# Patient Record
Sex: MALE | Race: WHITE | NOT HISPANIC OR LATINO | Employment: FULL TIME | URBAN - METROPOLITAN AREA
[De-identification: names, ages, dates, MRNs, and addresses within clinical notes are randomized per-mention and may not be internally consistent; named-entity substitution may affect disease eponyms.]

---

## 2023-02-24 PROBLEM — L64.9 ALOPECIA, MALE PATTERN: Status: ACTIVE | Noted: 2023-02-24

## 2023-02-24 PROBLEM — M79.12 MYALGIA OF AUXILIARY MUSCLES, HEAD AND NECK: Status: ACTIVE | Noted: 2023-02-24

## 2023-02-24 PROBLEM — Z98.52 VASECTOMY STATUS: Status: ACTIVE | Noted: 2023-02-24

## 2023-02-24 PROBLEM — M54.2 CHRONIC NECK PAIN: Status: ACTIVE | Noted: 2023-02-24

## 2023-02-24 PROBLEM — D22.9 SKIN MOLE: Status: ACTIVE | Noted: 2023-02-24

## 2023-02-24 PROBLEM — G89.29 CHRONIC NECK PAIN: Status: ACTIVE | Noted: 2023-02-24

## 2023-02-24 PROBLEM — M99.02 SEGMENTAL AND SOMATIC DYSFUNCTION OF THORACIC REGION: Status: ACTIVE | Noted: 2023-02-24

## 2023-02-24 PROBLEM — M79.9 POSTURAL STRAIN: Status: ACTIVE | Noted: 2023-02-24

## 2023-02-24 PROBLEM — M99.05 SEGMENTAL AND SOMATIC DYSFUNCTION OF PELVIC REGION: Status: ACTIVE | Noted: 2023-02-24

## 2023-02-24 PROBLEM — M99.01 SEGMENTAL AND SOMATIC DYSFUNCTION OF CERVICAL REGION: Status: ACTIVE | Noted: 2023-02-24

## 2023-02-24 PROBLEM — M99.00 SEGMENTAL AND SOMATIC DYSFUNCTION OF HEAD REGION: Status: ACTIVE | Noted: 2023-02-24

## 2023-02-24 PROBLEM — M54.50 LUMBAGO WITHOUT SCIATICA: Status: ACTIVE | Noted: 2023-02-24

## 2023-02-24 RX ORDER — FINASTERIDE 1 MG/1
1 TABLET, FILM COATED ORAL DAILY
COMMUNITY
Start: 2022-03-23

## 2023-02-24 RX ORDER — TIZANIDINE 4 MG/1
1 TABLET ORAL DAILY
COMMUNITY
End: 2023-10-09 | Stop reason: SDUPTHER

## 2023-03-13 DIAGNOSIS — Z00.00 HEALTH CARE MAINTENANCE: ICD-10-CM

## 2023-03-21 ENCOUNTER — OFFICE VISIT (OUTPATIENT)
Dept: PRIMARY CARE | Facility: CLINIC | Age: 38
End: 2023-03-21
Payer: COMMERCIAL

## 2023-03-21 VITALS
OXYGEN SATURATION: 100 % | RESPIRATION RATE: 18 BRPM | BODY MASS INDEX: 24.61 KG/M2 | WEIGHT: 162.4 LBS | HEART RATE: 71 BPM | HEIGHT: 68 IN

## 2023-03-21 DIAGNOSIS — Z00.00 ROUTINE GENERAL MEDICAL EXAMINATION AT A HEALTH CARE FACILITY: Primary | ICD-10-CM

## 2023-03-21 PROCEDURE — 99395 PREV VISIT EST AGE 18-39: CPT | Performed by: INTERNAL MEDICINE

## 2023-03-21 ASSESSMENT — PROMIS GLOBAL HEALTH SCALE
CARRYOUT_PHYSICAL_ACTIVITIES: COMPLETELY
EMOTIONAL_PROBLEMS: SOMETIMES
RATE_AVERAGE_PAIN: 5
RATE_GENERAL_HEALTH: VERY GOOD
RATE_PHYSICAL_HEALTH: GOOD
RATE_MENTAL_HEALTH: GOOD
RATE_AVERAGE_FATIGUE: MILD
CARRYOUT_SOCIAL_ACTIVITIES: VERY GOOD
RATE_SOCIAL_SATISFACTION: GOOD
RATE_QUALITY_OF_LIFE: VERY GOOD

## 2023-03-21 ASSESSMENT — ENCOUNTER SYMPTOMS
EYES NEGATIVE: 1
ALLERGIC/IMMUNOLOGIC NEGATIVE: 1
PSYCHIATRIC NEGATIVE: 1
NEUROLOGICAL NEGATIVE: 1
RESPIRATORY NEGATIVE: 1
GASTROINTESTINAL NEGATIVE: 1
DEPRESSION: 0
CARDIOVASCULAR NEGATIVE: 1
HEMATOLOGIC/LYMPHATIC NEGATIVE: 1
LOSS OF SENSATION IN FEET: 0
ENDOCRINE NEGATIVE: 1
CONSTITUTIONAL NEGATIVE: 1
OCCASIONAL FEELINGS OF UNSTEADINESS: 0

## 2023-03-21 NOTE — PROGRESS NOTES
"Subjective   Patient ID: Sharath Harris is a 37 y.o. male who presents for Annual Exam.    CPE :    37 M    Doing Well    Male pattern hair loss    H/o Cervical/Trapezius muscle strain        HPI     Review of Systems   Constitutional: Negative.    HENT: Negative.     Eyes: Negative.    Respiratory: Negative.     Cardiovascular: Negative.    Gastrointestinal: Negative.    Endocrine: Negative.    Genitourinary: Negative.    Skin: Negative.    Allergic/Immunologic: Negative.    Neurological: Negative.    Hematological: Negative.    Psychiatric/Behavioral: Negative.         Objective   Pulse 71   Resp 18   Ht 1.715 m (5' 7.5\")   Wt 73.7 kg (162 lb 6.4 oz)   SpO2 100%   BMI 25.06 kg/m²     Physical Exam  Constitutional:       Appearance: Normal appearance.   HENT:      Head: Normocephalic and atraumatic.      Right Ear: Tympanic membrane, ear canal and external ear normal.      Left Ear: Tympanic membrane, ear canal and external ear normal.      Nose: Nose normal.      Mouth/Throat:      Mouth: Mucous membranes are moist.   Eyes:      Extraocular Movements: Extraocular movements intact.      Conjunctiva/sclera: Conjunctivae normal.      Pupils: Pupils are equal, round, and reactive to light.   Cardiovascular:      Rate and Rhythm: Normal rate and regular rhythm.      Pulses: Normal pulses.      Heart sounds: Normal heart sounds.   Pulmonary:      Effort: Pulmonary effort is normal.      Breath sounds: Normal breath sounds.   Abdominal:      General: Abdomen is flat. Bowel sounds are normal.      Palpations: Abdomen is soft.   Genitourinary:     Testes: Normal.   Musculoskeletal:         General: Normal range of motion.      Cervical back: Normal range of motion.   Skin:     General: Skin is warm.   Neurological:      General: No focal deficit present.      Mental Status: He is alert and oriented to person, place, and time.   Psychiatric:         Mood and Affect: Mood normal.         Behavior: Behavior normal. "         Assessment/Plan        R finger wart    Male pattern hair loss    H/o Cervical/Trapezius muscle strain    Plan:    Labs    Continue with current treatment.    Follow up in 12 months/PRN

## 2023-03-22 ENCOUNTER — LAB (OUTPATIENT)
Dept: LAB | Facility: LAB | Age: 38
End: 2023-03-22
Payer: COMMERCIAL

## 2023-03-22 DIAGNOSIS — Z00.00 HEALTH CARE MAINTENANCE: ICD-10-CM

## 2023-03-22 LAB
ALANINE AMINOTRANSFERASE (SGPT) (U/L) IN SER/PLAS: 17 U/L (ref 10–52)
ALBUMIN (G/DL) IN SER/PLAS: 5.1 G/DL (ref 3.4–5)
ALKALINE PHOSPHATASE (U/L) IN SER/PLAS: 52 U/L (ref 33–120)
ANION GAP IN SER/PLAS: 16 MMOL/L (ref 10–20)
ASPARTATE AMINOTRANSFERASE (SGOT) (U/L) IN SER/PLAS: 22 U/L (ref 9–39)
BASOPHILS (10*3/UL) IN BLOOD BY AUTOMATED COUNT: 0.03 X10E9/L (ref 0–0.1)
BASOPHILS/100 LEUKOCYTES IN BLOOD BY AUTOMATED COUNT: 0.4 % (ref 0–2)
BILIRUBIN TOTAL (MG/DL) IN SER/PLAS: 0.8 MG/DL (ref 0–1.2)
CALCIUM (MG/DL) IN SER/PLAS: 10.5 MG/DL (ref 8.6–10.6)
CARBON DIOXIDE, TOTAL (MMOL/L) IN SER/PLAS: 27 MMOL/L (ref 21–32)
CHLORIDE (MMOL/L) IN SER/PLAS: 101 MMOL/L (ref 98–107)
CHOLESTEROL (MG/DL) IN SER/PLAS: 237 MG/DL (ref 0–199)
CHOLESTEROL IN HDL (MG/DL) IN SER/PLAS: 103.3 MG/DL
CHOLESTEROL/HDL RATIO: 2.3
CREATININE (MG/DL) IN SER/PLAS: 1.01 MG/DL (ref 0.5–1.3)
EOSINOPHILS (10*3/UL) IN BLOOD BY AUTOMATED COUNT: 0.22 X10E9/L (ref 0–0.7)
EOSINOPHILS/100 LEUKOCYTES IN BLOOD BY AUTOMATED COUNT: 3.1 % (ref 0–6)
ERYTHROCYTE DISTRIBUTION WIDTH (RATIO) BY AUTOMATED COUNT: 12.1 % (ref 11.5–14.5)
ERYTHROCYTE MEAN CORPUSCULAR HEMOGLOBIN CONCENTRATION (G/DL) BY AUTOMATED: 34 G/DL (ref 32–36)
ERYTHROCYTE MEAN CORPUSCULAR VOLUME (FL) BY AUTOMATED COUNT: 88 FL (ref 80–100)
ERYTHROCYTES (10*6/UL) IN BLOOD BY AUTOMATED COUNT: 4.99 X10E12/L (ref 4.5–5.9)
GFR MALE: >90 ML/MIN/1.73M2
GLUCOSE (MG/DL) IN SER/PLAS: 102 MG/DL (ref 74–99)
HEMATOCRIT (%) IN BLOOD BY AUTOMATED COUNT: 44.1 % (ref 41–52)
HEMOGLOBIN (G/DL) IN BLOOD: 15 G/DL (ref 13.5–17.5)
IMMATURE GRANULOCYTES/100 LEUKOCYTES IN BLOOD BY AUTOMATED COUNT: 0.1 % (ref 0–0.9)
LDL: 112 MG/DL (ref 0–99)
LEUKOCYTES (10*3/UL) IN BLOOD BY AUTOMATED COUNT: 7 X10E9/L (ref 4.4–11.3)
LYMPHOCYTES (10*3/UL) IN BLOOD BY AUTOMATED COUNT: 3.04 X10E9/L (ref 1.2–4.8)
LYMPHOCYTES/100 LEUKOCYTES IN BLOOD BY AUTOMATED COUNT: 43.2 % (ref 13–44)
MONOCYTES (10*3/UL) IN BLOOD BY AUTOMATED COUNT: 0.78 X10E9/L (ref 0.1–1)
MONOCYTES/100 LEUKOCYTES IN BLOOD BY AUTOMATED COUNT: 11.1 % (ref 2–10)
MUCUS, URINE: NORMAL /LPF
NEUTROPHILS (10*3/UL) IN BLOOD BY AUTOMATED COUNT: 2.95 X10E9/L (ref 1.2–7.7)
NEUTROPHILS/100 LEUKOCYTES IN BLOOD BY AUTOMATED COUNT: 42.1 % (ref 40–80)
NRBC (PER 100 WBCS) BY AUTOMATED COUNT: 0 /100 WBC (ref 0–0)
PLATELETS (10*3/UL) IN BLOOD AUTOMATED COUNT: 284 X10E9/L (ref 150–450)
POTASSIUM (MMOL/L) IN SER/PLAS: 4.2 MMOL/L (ref 3.5–5.3)
PROTEIN TOTAL: 7.7 G/DL (ref 6.4–8.2)
RBC, URINE: 2 /HPF (ref 0–5)
SODIUM (MMOL/L) IN SER/PLAS: 140 MMOL/L (ref 136–145)
SQUAMOUS EPITHELIAL CELLS, URINE: <1 /HPF
THYROTROPIN (MIU/L) IN SER/PLAS BY DETECTION LIMIT <= 0.05 MIU/L: 1.37 MIU/L (ref 0.44–3.98)
TRIGLYCERIDE (MG/DL) IN SER/PLAS: 108 MG/DL (ref 0–149)
UREA NITROGEN (MG/DL) IN SER/PLAS: 17 MG/DL (ref 6–23)
VLDL: 22 MG/DL (ref 0–40)
WBC, URINE: 1 /HPF (ref 0–5)

## 2023-03-22 PROCEDURE — 84443 ASSAY THYROID STIM HORMONE: CPT

## 2023-03-22 PROCEDURE — 36415 COLL VENOUS BLD VENIPUNCTURE: CPT

## 2023-03-22 PROCEDURE — 80053 COMPREHEN METABOLIC PANEL: CPT

## 2023-03-22 PROCEDURE — 85025 COMPLETE CBC W/AUTO DIFF WBC: CPT

## 2023-03-22 PROCEDURE — 81001 URINALYSIS AUTO W/SCOPE: CPT

## 2023-03-22 PROCEDURE — 80061 LIPID PANEL: CPT

## 2023-10-09 DIAGNOSIS — M54.50 LOW BACK PAIN WITHOUT SCIATICA, UNSPECIFIED BACK PAIN LATERALITY, UNSPECIFIED CHRONICITY: ICD-10-CM

## 2023-10-09 RX ORDER — TIZANIDINE 4 MG/1
4 TABLET ORAL DAILY
Qty: 90 TABLET | Refills: 3 | Status: SHIPPED | OUTPATIENT
Start: 2023-10-09 | End: 2024-10-08

## 2024-07-15 DIAGNOSIS — M54.50 LOW BACK PAIN WITHOUT SCIATICA, UNSPECIFIED BACK PAIN LATERALITY, UNSPECIFIED CHRONICITY: ICD-10-CM

## 2024-07-15 RX ORDER — TIZANIDINE 4 MG/1
4 TABLET ORAL DAILY
Qty: 90 TABLET | Refills: 3 | Status: SHIPPED | OUTPATIENT
Start: 2024-07-15 | End: 2025-07-15

## 2024-08-09 ENCOUNTER — TELEMEDICINE (OUTPATIENT)
Dept: PRIMARY CARE | Facility: CLINIC | Age: 39
End: 2024-08-09
Payer: COMMERCIAL

## 2024-08-09 DIAGNOSIS — R05.8 UPPER AIRWAY COUGH SYNDROME: Primary | ICD-10-CM

## 2024-08-09 PROCEDURE — 99213 OFFICE O/P EST LOW 20 MIN: CPT | Performed by: INTERNAL MEDICINE

## 2024-08-09 RX ORDER — FLUTICASONE PROPIONATE 50 MCG
1 SPRAY, SUSPENSION (ML) NASAL 2 TIMES DAILY
Qty: 16 G | Refills: 1 | Status: SHIPPED | OUTPATIENT
Start: 2024-08-09 | End: 2024-10-08

## 2024-08-09 RX ORDER — AMOXICILLIN AND CLAVULANATE POTASSIUM 875; 125 MG/1; MG/1
875 TABLET, FILM COATED ORAL 2 TIMES DAILY
Qty: 14 TABLET | Refills: 0 | Status: SHIPPED | OUTPATIENT
Start: 2024-08-09 | End: 2024-08-16

## 2024-08-09 ASSESSMENT — ENCOUNTER SYMPTOMS
COUGH: 1
SWEATS: 0
FEVER: 0
WHEEZING: 1
MYALGIAS: 1
HEMOPTYSIS: 0
HEARTBURN: 0
SHORTNESS OF BREATH: 0
RHINORRHEA: 0
WEIGHT LOSS: 0
CHILLS: 0
SORE THROAT: 0
HEADACHES: 0

## 2024-08-09 NOTE — PROGRESS NOTES
Subjective   Patient ID: Sharath Harris is a 39 y.o. male who presents for Cough x 4 weeks      Cough  The current episode started more than 1 month ago. The problem has been gradually worsening. The problem occurs every few minutes. The cough is Non-productive. Associated symptoms include ear congestion, myalgias, postnasal drip and wheezing. Pertinent negatives include no chest pain, chills, ear pain, fever, headaches, heartburn, hemoptysis, nasal congestion, rash, rhinorrhea, sore throat, shortness of breath, sweats or weight loss. Nothing aggravates the symptoms.        Ear congestion    Cough- no phlegm            Review of Systems   Constitutional:  Negative for chills, fever and weight loss.   HENT:  Positive for postnasal drip. Negative for ear pain, rhinorrhea and sore throat.    Respiratory:  Positive for cough and wheezing. Negative for hemoptysis and shortness of breath.    Cardiovascular:  Negative for chest pain.   Gastrointestinal:  Negative for heartburn.   Musculoskeletal:  Positive for myalgias.   Skin:  Negative for rash.   Neurological:  Negative for headaches.       + PND  No Fever/chills/headaches/dizziness/chest pains/ shortness of breath/palpitations/ abdominal pain /Nausea/vomiting/diarrhea/ constipation/urine frequency/blood in urine.    Cough    Objective   There were no vitals taken for this visit.    Physical Exam    N/A    Assessment/Plan        Upper airway cough syndrome       Infection Vs Inflammation Vs Allergic    Plan:    Augmentin 875 mg bid x10 days     Fluticasone- 1 spray per nostril BID

## 2024-08-23 ENCOUNTER — OFFICE VISIT (OUTPATIENT)
Dept: PRIMARY CARE | Facility: CLINIC | Age: 39
End: 2024-08-23
Payer: COMMERCIAL

## 2024-08-23 ENCOUNTER — LAB (OUTPATIENT)
Dept: LAB | Facility: LAB | Age: 39
End: 2024-08-23
Payer: COMMERCIAL

## 2024-08-23 VITALS
HEIGHT: 68 IN | HEART RATE: 70 BPM | TEMPERATURE: 98.1 F | WEIGHT: 159.2 LBS | BODY MASS INDEX: 24.13 KG/M2 | DIASTOLIC BLOOD PRESSURE: 70 MMHG | SYSTOLIC BLOOD PRESSURE: 123 MMHG

## 2024-08-23 DIAGNOSIS — Z11.59 ENCOUNTER FOR HEPATITIS C SCREENING TEST FOR LOW RISK PATIENT: ICD-10-CM

## 2024-08-23 DIAGNOSIS — Z00.00 ROUTINE GENERAL MEDICAL EXAMINATION AT A HEALTH CARE FACILITY: ICD-10-CM

## 2024-08-23 DIAGNOSIS — Z11.4 SCREENING FOR HIV (HUMAN IMMUNODEFICIENCY VIRUS): ICD-10-CM

## 2024-08-23 DIAGNOSIS — Z51.81 ENCOUNTER FOR MEDICATION MONITORING: ICD-10-CM

## 2024-08-23 DIAGNOSIS — Z51.81 ENCOUNTER FOR MEDICATION MONITORING: Primary | ICD-10-CM

## 2024-08-23 DIAGNOSIS — R05.1 ACUTE COUGH: ICD-10-CM

## 2024-08-23 LAB
ALBUMIN SERPL BCP-MCNC: 4.5 G/DL (ref 3.4–5)
ALP SERPL-CCNC: 47 U/L (ref 33–120)
ALT SERPL W P-5'-P-CCNC: 15 U/L (ref 10–52)
ANION GAP SERPL CALC-SCNC: 14 MMOL/L (ref 10–20)
AST SERPL W P-5'-P-CCNC: 21 U/L (ref 9–39)
BASOPHILS # BLD AUTO: 0.03 X10*3/UL (ref 0–0.1)
BASOPHILS NFR BLD AUTO: 0.5 %
BILIRUB SERPL-MCNC: 0.6 MG/DL (ref 0–1.2)
BUN SERPL-MCNC: 17 MG/DL (ref 6–23)
CALCIUM SERPL-MCNC: 10.1 MG/DL (ref 8.6–10.3)
CHLORIDE SERPL-SCNC: 100 MMOL/L (ref 98–107)
CO2 SERPL-SCNC: 29 MMOL/L (ref 21–32)
CREAT SERPL-MCNC: 1 MG/DL (ref 0.5–1.3)
EGFRCR SERPLBLD CKD-EPI 2021: >90 ML/MIN/1.73M*2
EOSINOPHIL # BLD AUTO: 0.13 X10*3/UL (ref 0–0.7)
EOSINOPHIL NFR BLD AUTO: 2 %
ERYTHROCYTE [DISTWIDTH] IN BLOOD BY AUTOMATED COUNT: 12 % (ref 11.5–14.5)
GLUCOSE SERPL-MCNC: 81 MG/DL (ref 74–99)
HCT VFR BLD AUTO: 42.1 % (ref 41–52)
HGB BLD-MCNC: 14.2 G/DL (ref 13.5–17.5)
IMM GRANULOCYTES # BLD AUTO: 0.01 X10*3/UL (ref 0–0.7)
IMM GRANULOCYTES NFR BLD AUTO: 0.2 % (ref 0–0.9)
LYMPHOCYTES # BLD AUTO: 2.63 X10*3/UL (ref 1.2–4.8)
LYMPHOCYTES NFR BLD AUTO: 40.9 %
MCH RBC QN AUTO: 30.2 PG (ref 26–34)
MCHC RBC AUTO-ENTMCNC: 33.7 G/DL (ref 32–36)
MCV RBC AUTO: 90 FL (ref 80–100)
MONOCYTES # BLD AUTO: 0.74 X10*3/UL (ref 0.1–1)
MONOCYTES NFR BLD AUTO: 11.5 %
NEUTROPHILS # BLD AUTO: 2.89 X10*3/UL (ref 1.2–7.7)
NEUTROPHILS NFR BLD AUTO: 44.9 %
NRBC BLD-RTO: 0 /100 WBCS (ref 0–0)
PLATELET # BLD AUTO: 313 X10*3/UL (ref 150–450)
POTASSIUM SERPL-SCNC: 4 MMOL/L (ref 3.5–5.3)
PROT SERPL-MCNC: 6.8 G/DL (ref 6.4–8.2)
RBC # BLD AUTO: 4.7 X10*6/UL (ref 4.5–5.9)
SODIUM SERPL-SCNC: 139 MMOL/L (ref 136–145)
TSH SERPL-ACNC: 0.88 MIU/L (ref 0.44–3.98)
WBC # BLD AUTO: 6.4 X10*3/UL (ref 4.4–11.3)

## 2024-08-23 PROCEDURE — 87389 HIV-1 AG W/HIV-1&-2 AB AG IA: CPT

## 2024-08-23 PROCEDURE — 85025 COMPLETE CBC W/AUTO DIFF WBC: CPT

## 2024-08-23 PROCEDURE — 80053 COMPREHEN METABOLIC PANEL: CPT

## 2024-08-23 PROCEDURE — 36415 COLL VENOUS BLD VENIPUNCTURE: CPT

## 2024-08-23 PROCEDURE — 84443 ASSAY THYROID STIM HORMONE: CPT

## 2024-08-23 PROCEDURE — 86803 HEPATITIS C AB TEST: CPT

## 2024-08-23 RX ORDER — BENZONATATE 200 MG/1
200 CAPSULE ORAL 3 TIMES DAILY PRN
Qty: 42 CAPSULE | Refills: 0 | Status: SHIPPED | OUTPATIENT
Start: 2024-08-23 | End: 2024-09-22

## 2024-08-23 ASSESSMENT — ENCOUNTER SYMPTOMS
OCCASIONAL FEELINGS OF UNSTEADINESS: 0
UNEXPECTED WEIGHT CHANGE: 0
HEMATURIA: 0
FEVER: 0
DEPRESSION: 0
LOSS OF SENSATION IN FEET: 0
DIZZINESS: 0
RHINORRHEA: 0
LIGHT-HEADEDNESS: 0
SHORTNESS OF BREATH: 0
EYE PAIN: 0
ABDOMINAL PAIN: 0
CHILLS: 0

## 2024-08-23 NOTE — PROGRESS NOTES
"Subjective     Patient ID: Sharath Harris is a 39 y.o. male who presents for  acute cough and physical.   Review of Systems   Constitutional:  Negative for chills, fever and unexpected weight change.   HENT:  Negative for rhinorrhea.    Eyes:  Negative for pain.   Respiratory:  Negative for shortness of breath.    Cardiovascular:  Negative for chest pain.   Gastrointestinal:  Negative for abdominal pain.   Genitourinary:  Negative for hematuria.   Skin:  Negative for rash.   Neurological:  Negative for dizziness, syncope and light-headedness.   Psychiatric/Behavioral:  Negative for behavioral problems and suicidal ideas.        /70 (BP Location: Right arm, Patient Position: Sitting, BP Cuff Size: Adult)   Pulse 70   Temp 36.7 °C (98.1 °F)   Ht 1.727 m (5' 8\")   Wt 72.2 kg (159 lb 3.2 oz)   BMI 24.21 kg/m²      Objective   Physical Exam  Vitals reviewed.   Constitutional:       General: He is not in acute distress.  HENT:      Head: Normocephalic.      Right Ear: External ear normal.      Left Ear: External ear normal.      Nose: No rhinorrhea.      Mouth/Throat:      Mouth: Mucous membranes are moist.   Eyes:      Conjunctiva/sclera: Conjunctivae normal.   Cardiovascular:      Rate and Rhythm: Normal rate and regular rhythm.      Heart sounds: No murmur heard.     No friction rub. No gallop.   Pulmonary:      Effort: No respiratory distress.      Breath sounds: No wheezing, rhonchi or rales.   Abdominal:      General: Bowel sounds are normal. There is no distension.      Palpations: Abdomen is soft.      Tenderness: There is no abdominal tenderness.   Musculoskeletal:      Cervical back: Neck supple.      Right lower leg: No edema.      Left lower leg: No edema.   Skin:     General: Skin is warm and dry.      Capillary Refill: Capillary refill takes less than 2 seconds.   Neurological:      Mental Status: He is alert.      Gait: Gait normal.           Labs:   Lab Results   Component Value Date    WBC " "6.4 08/23/2024    HGB 14.2 08/23/2024    HCT 42.1 08/23/2024     08/23/2024    TSH 0.88 08/23/2024    PSA 0.61 03/23/2022     Lab Results   Component Value Date     08/23/2024    K 4.0 08/23/2024     08/23/2024    BUN 17 08/23/2024    CREATININE 1.00 08/23/2024    GLUCOSE 81 08/23/2024    CALCIUM 10.1 08/23/2024    PROT 6.8 08/23/2024    BILITOT 0.6 08/23/2024    ALKPHOS 47 08/23/2024    AST 21 08/23/2024    ALT 15 08/23/2024     Lab Results   Component Value Date    CHOL 237 (H) 03/22/2023    CHOL 211 (H) 03/23/2022      Lab Results   Component Value Date    TRIG 108 03/22/2023    TRIG 66 03/23/2022      Lab Results   Component Value Date    .3 03/22/2023    HDL 87.0 03/23/2022     No results found for: \"LDLCALC\"   Lab Results   Component Value Date    VLDL 22 03/22/2023    VLDL 13 03/23/2022    No components found for: \"CHOLHDLRATI0\"    Imaging/Testing: CT HEART CALCIUM SCORING WO IV CONTRAST  MRN: [id number]  Patient Name: MILTON BRANDT     STUDY:  CT CARDIAC SCORING;  4/12/2022 8:17 am     INDICATION:  executive  Z00.00: Health Maintenance.     COMPARISON:  None.     ACCESSION NUMBER(S):  [id number]     ORDERING CLINICIAN:  MORGAN TITUS     TECHNIQUE:  Using prospective ECG gating, limited CT scan of the coronary  arteries was performed without intravenous contrast. Coronary calcium  scoring  was performed according to the method of Agatston.     FINDINGS:  The score and distribution of calcium in the coronary arteries is as  follows:     LM: 0.  LAD: 0.  LCx: 0.  RCA: 0.     Total: 0.     The visualized segments of the lungs are normally expanded.     The visualized mid/lower ascending thoracic aorta measures 3.2 cm in  diameter.     The heart is normal in size. Trace pericardial effusion.     No gross evidence of mediastinal or hilar lymphadenopathy is  identified.     Retroaortic left renal vein partially imaged. Mild bilateral  gynecomastia.     IMPRESSION:  1. Coronary artery calcium " "score of 0 *.  2. Additional findings as above.     *Coronary artery calcium scoring may be helpful in predicting the  risk for future coronary heart disease events.  According to the  American College of Cardiology Foundation Clinical Expert Consensus  Task Force, such testing provides important prognostic information in  patients with more than one coronary heart disease risk factor. The  coronary artery calcium score correlates with the annual risk of a  non-fatal myocardial infarction or coronary heart disease death.     Coronary artery score            Annual Risk     0-99                             0.4%  100-399                        1.3%  >400                            2.4%     These three \"breakpoints\" correspond to lower, intermediate and high  risk states for future coronary events.  Such information should be  used, along with appropriate clinical judgment, to make decisions  regarding the intensity of risk factor management strategies to treat  blood lipids and to modify other non-lipid coronary risk factors.     Reference: Williamson P et al. Circulation.  2007; 115:402-426    Assessment/Plan   Problem List Items Addressed This Visit    None  Visit Diagnoses       Encounter for medication monitoring    -  Primary    Relevant Orders    CBC and Auto Differential (Completed)    Comprehensive Metabolic Panel (Completed)    Acute cough        Relevant Medications    benzonatate (Tessalon) 150 mg capsule    Other Relevant Orders    XR chest 2 views    Routine general medical examination at a health care facility        Relevant Orders    Thyroid Stimulating Hormone (Completed)    Screening for HIV (human immunodeficiency virus)        Relevant Orders    HIV 1/2 Antigen/Antibody Screen with Reflex to Confirmation (Completed)    Encounter for hepatitis C screening test for low risk patient        Relevant Orders    Hepatitis C antibody (Completed)            [unfilled]    Patient and I discussed " diet/nutrition, lifestyle modifications, safety, medication indications and side effects, and health goals.    orders and follow up as documented in EMR, repeat labs ordered prior to next appointment, reviewed compliance with lifestyle measures, reviewed diet, exercise and weight control, reviewed medications and side effects in detail           I have reviewed OARRS report, consistent with prescribed medication. I have considered risks of abuse, diversion, side effects and feel clinically benefit of medication outweighs risks at this time.

## 2024-08-24 LAB
HCV AB SER QL: NONREACTIVE
HIV 1+2 AB+HIV1 P24 AG SERPL QL IA: NONREACTIVE

## 2024-09-03 ENCOUNTER — HOSPITAL ENCOUNTER (OUTPATIENT)
Dept: RADIOLOGY | Facility: CLINIC | Age: 39
Discharge: HOME | End: 2024-09-03
Payer: COMMERCIAL

## 2024-09-03 DIAGNOSIS — R05.9 COUGH, UNSPECIFIED TYPE: ICD-10-CM

## 2024-09-03 PROCEDURE — 71046 X-RAY EXAM CHEST 2 VIEWS: CPT | Mod: FOREIGN READ | Performed by: RADIOLOGY

## 2024-09-03 PROCEDURE — 71046 X-RAY EXAM CHEST 2 VIEWS: CPT

## 2024-10-10 ENCOUNTER — TELEPHONE (OUTPATIENT)
Dept: PRIMARY CARE | Facility: CLINIC | Age: 39
End: 2024-10-10

## 2024-10-10 DIAGNOSIS — M54.50 LOW BACK PAIN WITHOUT SCIATICA, UNSPECIFIED BACK PAIN LATERALITY, UNSPECIFIED CHRONICITY: ICD-10-CM

## 2024-10-10 RX ORDER — TIZANIDINE 4 MG/1
4 TABLET ORAL DAILY
Qty: 60 TABLET | Refills: 0 | Status: SHIPPED | OUTPATIENT
Start: 2024-10-10 | End: 2024-10-11 | Stop reason: SDUPTHER

## 2024-10-11 RX ORDER — TIZANIDINE 4 MG/1
4 TABLET ORAL DAILY
Qty: 60 TABLET | Refills: 0 | Status: SHIPPED | OUTPATIENT
Start: 2024-10-11 | End: 2024-12-10

## 2024-10-11 NOTE — TELEPHONE ENCOUNTER
I sent in the refill of Zanaflex to patient's pharmacy [facility].    Rigo Peterson MD, [facility]  Physician  Department of Family Medicine of [facility] - Primary Care

## 2024-11-12 ENCOUNTER — APPOINTMENT (OUTPATIENT)
Dept: PRIMARY CARE | Facility: CLINIC | Age: 39
End: 2024-11-12

## 2024-11-25 DIAGNOSIS — M54.50 LOW BACK PAIN WITHOUT SCIATICA, UNSPECIFIED BACK PAIN LATERALITY, UNSPECIFIED CHRONICITY: ICD-10-CM

## 2024-11-25 RX ORDER — TIZANIDINE 4 MG/1
4 TABLET ORAL DAILY
Qty: 90 TABLET | Refills: 1 | Status: SHIPPED | OUTPATIENT
Start: 2024-11-25 | End: 2025-05-24

## 2024-11-25 NOTE — TELEPHONE ENCOUNTER
Patient's insurance is requiring him to use mail order pharmacy for his prescriptions. He needs a 90 day refill sent to [facility]. I've pended the refill for you.     He is part of our  Executive Health program and emailed me the request. Thank you!

## 2024-12-13 ENCOUNTER — HOSPITAL ENCOUNTER (OUTPATIENT)
Dept: VASCULAR MEDICINE | Facility: HOSPITAL | Age: 39
Discharge: HOME | End: 2024-12-13
Payer: COMMERCIAL

## 2024-12-13 ENCOUNTER — APPOINTMENT (OUTPATIENT)
Dept: INTEGRATIVE MEDICINE | Facility: CLINIC | Age: 39
End: 2024-12-13

## 2024-12-13 ENCOUNTER — NUTRITION (OUTPATIENT)
Dept: PRIMARY CARE | Facility: CLINIC | Age: 39
End: 2024-12-13

## 2024-12-13 ENCOUNTER — APPOINTMENT (OUTPATIENT)
Dept: PRIMARY CARE | Facility: CLINIC | Age: 39
End: 2024-12-13

## 2024-12-13 ENCOUNTER — HOSPITAL ENCOUNTER (OUTPATIENT)
Dept: CARDIOLOGY | Facility: HOSPITAL | Age: 39
Discharge: HOME | End: 2024-12-13
Payer: COMMERCIAL

## 2024-12-13 ENCOUNTER — HOSPITAL ENCOUNTER (OUTPATIENT)
Dept: RADIOLOGY | Facility: HOSPITAL | Age: 39
Discharge: HOME | End: 2024-12-13
Payer: COMMERCIAL

## 2024-12-13 VITALS
HEART RATE: 72 BPM | SYSTOLIC BLOOD PRESSURE: 118 MMHG | HEIGHT: 67 IN | WEIGHT: 146 LBS | DIASTOLIC BLOOD PRESSURE: 70 MMHG | BODY MASS INDEX: 22.91 KG/M2 | OXYGEN SATURATION: 98 %

## 2024-12-13 DIAGNOSIS — Z00.00 HEALTH MAINTENANCE EXAMINATION: Primary | ICD-10-CM

## 2024-12-13 DIAGNOSIS — Z00.00 HEALTH MAINTENANCE EXAMINATION: ICD-10-CM

## 2024-12-13 DIAGNOSIS — L30.9 DERMATITIS: ICD-10-CM

## 2024-12-13 DIAGNOSIS — Z23 NEEDS FLU SHOT: ICD-10-CM

## 2024-12-13 DIAGNOSIS — Z13.6 ENCOUNTER FOR SCREENING FOR CARDIOVASCULAR DISORDERS: ICD-10-CM

## 2024-12-13 DIAGNOSIS — R09.89 OTHER SPECIFIED SYMPTOMS AND SIGNS INVOLVING THE CIRCULATORY AND RESPIRATORY SYSTEMS: ICD-10-CM

## 2024-12-13 DIAGNOSIS — L65.9 HAIR LOSS: ICD-10-CM

## 2024-12-13 LAB
25(OH)D3 SERPL-MCNC: 33 NG/ML (ref 30–100)
ALBUMIN SERPL BCP-MCNC: 5 G/DL (ref 3.4–5)
ALP SERPL-CCNC: 41 U/L (ref 33–120)
ALT SERPL W P-5'-P-CCNC: 14 U/L (ref 10–52)
ANION GAP SERPL CALC-SCNC: 12 MMOL/L (ref 10–20)
AST SERPL W P-5'-P-CCNC: 19 U/L (ref 9–39)
BASOPHILS # BLD AUTO: 0.02 X10*3/UL (ref 0–0.1)
BASOPHILS NFR BLD AUTO: 0.4 %
BILIRUB SERPL-MCNC: 0.7 MG/DL (ref 0–1.2)
BUN SERPL-MCNC: 21 MG/DL (ref 6–23)
CALCIUM SERPL-MCNC: 10.4 MG/DL (ref 8.6–10.3)
CHLORIDE SERPL-SCNC: 102 MMOL/L (ref 98–107)
CHOLEST SERPL-MCNC: 247 MG/DL (ref 0–199)
CHOLESTEROL/HDL RATIO: 2.4
CO2 SERPL-SCNC: 30 MMOL/L (ref 21–32)
CREAT SERPL-MCNC: 1.01 MG/DL (ref 0.5–1.3)
CRP SERPL HS-MCNC: 0.2 MG/L
EGFRCR SERPLBLD CKD-EPI 2021: >90 ML/MIN/1.73M*2
EOSINOPHIL # BLD AUTO: 0.09 X10*3/UL (ref 0–0.7)
EOSINOPHIL NFR BLD AUTO: 1.7 %
ERYTHROCYTE [DISTWIDTH] IN BLOOD BY AUTOMATED COUNT: 12.1 % (ref 11.5–14.5)
EST. AVERAGE GLUCOSE BLD GHB EST-MCNC: 103 MG/DL
FERRITIN SERPL-MCNC: 181 NG/ML (ref 20–300)
GLUCOSE SERPL-MCNC: 88 MG/DL (ref 74–99)
HBA1C MFR BLD: 5.2 %
HCT VFR BLD AUTO: 44.4 % (ref 41–52)
HDLC SERPL-MCNC: 102 MG/DL
HGB BLD-MCNC: 15 G/DL (ref 13.5–17.5)
IMM GRANULOCYTES # BLD AUTO: 0.02 X10*3/UL (ref 0–0.7)
IMM GRANULOCYTES NFR BLD AUTO: 0.4 % (ref 0–0.9)
INSULIN P FAST SERPL-ACNC: 4 UIU/ML (ref 3–25)
IRON SATN MFR SERPL: 36 %
IRON SERPL-MCNC: 134 UG/DL (ref 35–150)
LDLC SERPL CALC-MCNC: 136 MG/DL
LYMPHOCYTES # BLD AUTO: 2.15 X10*3/UL (ref 1.2–4.8)
LYMPHOCYTES NFR BLD AUTO: 41.3 %
MAGNESIUM SERPL-MCNC: 2.02 MG/DL (ref 1.6–2.4)
MCH RBC QN AUTO: 29.9 PG (ref 26–34)
MCHC RBC AUTO-ENTMCNC: 33.8 G/DL (ref 32–36)
MCV RBC AUTO: 88 FL (ref 80–100)
MONOCYTES # BLD AUTO: 0.48 X10*3/UL (ref 0.1–1)
MONOCYTES NFR BLD AUTO: 9.2 %
NEUTROPHILS # BLD AUTO: 2.44 X10*3/UL (ref 1.2–7.7)
NEUTROPHILS NFR BLD AUTO: 47 %
NON HDL CHOLESTEROL: 145 MG/DL (ref 0–149)
NRBC BLD-RTO: 0 /100 WBCS (ref 0–0)
PLATELET # BLD AUTO: 282 X10*3/UL (ref 150–450)
POTASSIUM SERPL-SCNC: 4.9 MMOL/L (ref 3.5–5.3)
PROT SERPL-MCNC: 7.4 G/DL (ref 6.4–8.2)
PSA SERPL-MCNC: 0.83 NG/ML
RBC # BLD AUTO: 5.02 X10*6/UL (ref 4.5–5.9)
SODIUM SERPL-SCNC: 139 MMOL/L (ref 136–145)
TIBC SERPL-MCNC: 376 UG/DL
TRIGL SERPL-MCNC: 47 MG/DL (ref 0–149)
TSH SERPL-ACNC: 0.87 MIU/L (ref 0.44–3.98)
UIBC SERPL-MCNC: 242 UG/DL (ref 110–370)
URATE SERPL-MCNC: 7.3 MG/DL (ref 4–7.5)
VIT B12 SERPL-MCNC: 490 PG/ML (ref 211–911)
VLDL: 9 MG/DL (ref 0–40)
WBC # BLD AUTO: 5.2 X10*3/UL (ref 4.4–11.3)

## 2024-12-13 PROCEDURE — 83036 HEMOGLOBIN GLYCOSYLATED A1C: CPT

## 2024-12-13 PROCEDURE — RXMED WILLOW AMBULATORY MEDICATION CHARGE

## 2024-12-13 PROCEDURE — EXAM4 EXAM 4: Performed by: INTERNAL MEDICINE

## 2024-12-13 PROCEDURE — 93880 EXTRACRANIAL BILAT STUDY: CPT | Performed by: INTERNAL MEDICINE

## 2024-12-13 PROCEDURE — 93978 VASCULAR STUDY: CPT | Performed by: INTERNAL MEDICINE

## 2024-12-13 PROCEDURE — 83550 IRON BINDING TEST: CPT

## 2024-12-13 PROCEDURE — 84402 ASSAY OF FREE TESTOSTERONE: CPT

## 2024-12-13 PROCEDURE — 83525 ASSAY OF INSULIN: CPT

## 2024-12-13 PROCEDURE — 93017 CV STRESS TEST TRACING ONLY: CPT

## 2024-12-13 PROCEDURE — 93978 VASCULAR STUDY: CPT

## 2024-12-13 PROCEDURE — 3008F BODY MASS INDEX DOCD: CPT | Performed by: INTERNAL MEDICINE

## 2024-12-13 PROCEDURE — 80061 LIPID PANEL: CPT

## 2024-12-13 PROCEDURE — 81002 URINALYSIS NONAUTO W/O SCOPE: CPT | Performed by: INTERNAL MEDICINE

## 2024-12-13 PROCEDURE — 83540 ASSAY OF IRON: CPT

## 2024-12-13 PROCEDURE — 90656 IIV3 VACC NO PRSV 0.5 ML IM: CPT | Performed by: INTERNAL MEDICINE

## 2024-12-13 PROCEDURE — 84443 ASSAY THYROID STIM HORMONE: CPT

## 2024-12-13 PROCEDURE — 82306 VITAMIN D 25 HYDROXY: CPT

## 2024-12-13 PROCEDURE — 83735 ASSAY OF MAGNESIUM: CPT

## 2024-12-13 PROCEDURE — 86141 C-REACTIVE PROTEIN HS: CPT

## 2024-12-13 PROCEDURE — NUTCO NUTRITION CONSULTATION: Performed by: DIETITIAN, REGISTERED

## 2024-12-13 PROCEDURE — 82607 VITAMIN B-12: CPT

## 2024-12-13 PROCEDURE — 82172 ASSAY OF APOLIPOPROTEIN: CPT

## 2024-12-13 PROCEDURE — 84153 ASSAY OF PSA TOTAL: CPT

## 2024-12-13 PROCEDURE — 71046 X-RAY EXAM CHEST 2 VIEWS: CPT

## 2024-12-13 PROCEDURE — 85025 COMPLETE CBC W/AUTO DIFF WBC: CPT

## 2024-12-13 PROCEDURE — 90471 IMMUNIZATION ADMIN: CPT | Performed by: INTERNAL MEDICINE

## 2024-12-13 PROCEDURE — 84550 ASSAY OF BLOOD/URIC ACID: CPT

## 2024-12-13 PROCEDURE — 82728 ASSAY OF FERRITIN: CPT

## 2024-12-13 PROCEDURE — 80053 COMPREHEN METABOLIC PANEL: CPT

## 2024-12-13 PROCEDURE — 93880 EXTRACRANIAL BILAT STUDY: CPT

## 2024-12-13 RX ORDER — TRIAMCINOLONE ACETONIDE 1 MG/G
CREAM TOPICAL 2 TIMES DAILY
Qty: 30 G | Refills: 1 | Status: SHIPPED | OUTPATIENT
Start: 2024-12-13 | End: 2025-02-11

## 2024-12-13 RX ORDER — FINASTERIDE 1 MG/1
1 TABLET, FILM COATED ORAL DAILY
Qty: 90 TABLET | Refills: 3 | Status: SHIPPED | OUTPATIENT
Start: 2024-12-13 | End: 2025-12-13

## 2024-12-13 ASSESSMENT — ENCOUNTER SYMPTOMS
CONSTIPATION: 0
DYSURIA: 0
FLANK PAIN: 0
HEMATOLOGIC/LYMPHATIC NEGATIVE: 1
BRUISES/BLEEDS EASILY: 0
NUMBNESS: 0
HEMATURIA: 0
NEUROLOGICAL NEGATIVE: 1
CHEST TIGHTNESS: 0
EYE PAIN: 0
AGITATION: 0
ABDOMINAL PAIN: 0
SHORTNESS OF BREATH: 0
DIZZINESS: 0
NAUSEA: 0
VOMITING: 0
DIARRHEA: 0
SLEEP DISTURBANCE: 0
ARTHRALGIAS: 0
JOINT SWELLING: 0
GASTROINTESTINAL NEGATIVE: 1
DIFFICULTY URINATING: 0
HEADACHES: 0
ADENOPATHY: 0
MYALGIAS: 0
FREQUENCY: 0
CONFUSION: 0
TREMORS: 0
SORE THROAT: 0
FATIGUE: 0
PALPITATIONS: 0
POLYDIPSIA: 0
NERVOUS/ANXIOUS: 0
WHEEZING: 0
COUGH: 0
EYE REDNESS: 0
BLOOD IN STOOL: 0
BACK PAIN: 0
ALLERGIC/IMMUNOLOGIC NEGATIVE: 1
SINUS PRESSURE: 0
APNEA: 0
FEVER: 0
APPETITE CHANGE: 0

## 2024-12-13 NOTE — PROGRESS NOTES
Executive Physical         Patient ID: Sharath Harris is a 39 y.o. male who presents for Executive Evaluation:    The following report is in reference to your  executive physical examination which was held at [facility] on 12/13/24. Firstly, let me state that it was a pleasure meeting with you and that we appreciate you coming to [facility] for your executive evaluation.    At the time of your evaluation you were feeling well with no significant health concerns.    You were not complaining of fever ,chills, headache ,dizziness ,cough, chest pain shortness of breath, palpitations, nausea, vomiting, abdominal pain, loss of appetite, diarrhea, blood in the stools, frequent urination or painful urination.      Review of Systems   Constitutional:  Negative for appetite change, fatigue and fever.   HENT:  Negative for congestion, ear discharge, ear pain, hearing loss, mouth sores, postnasal drip, sinus pressure, sore throat and tinnitus.    Eyes:  Negative for pain and redness.   Respiratory:  Negative for apnea, cough, chest tightness, shortness of breath and wheezing.    Cardiovascular:  Negative for chest pain, palpitations and leg swelling.   Gastrointestinal: Negative.  Negative for abdominal pain, blood in stool, constipation, diarrhea, nausea and vomiting.   Endocrine: Negative for polydipsia and polyuria.   Genitourinary:  Negative for decreased urine volume, difficulty urinating, dysuria, enuresis, flank pain, frequency, hematuria, penile discharge, penile pain, scrotal swelling, testicular pain and urgency.   Musculoskeletal:  Negative for arthralgias, back pain, gait problem, joint swelling and myalgias.   Skin:  Negative for pallor and rash.        Wart on R 5th finger.     Intermittent dryness of R buttock   Allergic/Immunologic: Negative.    Neurological: Negative.  Negative for dizziness, tremors, syncope, numbness and headaches.  "  Hematological: Negative.  Negative for adenopathy. Does not bruise/bleed easily.   Psychiatric/Behavioral:  Negative for agitation, confusion and sleep disturbance. The patient is not nervous/anxious.    All other systems reviewed and are negative.          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     Family History   Problem Relation Name Age of Onset    Scleroderma Mother      Hypertension Father          benign essential    Other (cardiac disorder) Paternal Grandmother      Other (cardiac disorder) Paternal Grandfather          No Known Allergies       Current Outpatient Medications:     finasteride (Propecia) 1 mg tablet, Take 1 tablet (1 mg) by mouth once daily., Disp: 90 tablet, Rfl: 3    tiZANidine (Zanaflex) 4 mg tablet, Take 1 tablet (4 mg) by mouth once daily., Disp: 90 tablet, Rfl: 1    triamcinolone (Kenalog) 0.1 % cream, Apply topically 2 times a day. Apply to affected area 1-2 times daily as needed. Avoid face and groin., Disp: 30 g, Rfl: 1     Visit Vitals  /70   Pulse 72   Ht 1.702 m (5' 7\")   Wt 66.2 kg (146 lb)   SpO2 98%   BMI 22.87 kg/m²   Smoking Status Never   BSA 1.77 m²        Physical Exam  Vitals reviewed.   Constitutional:       Appearance: Normal appearance.   HENT:      Head: Normocephalic and atraumatic.      Right Ear: Tympanic membrane and ear canal normal.      Left Ear: Tympanic membrane and ear canal normal.      Nose: Nose normal.      Mouth/Throat:      Mouth: Mucous membranes are moist.   Eyes:      Extraocular Movements: Extraocular movements " intact.      Pupils: Pupils are equal, round, and reactive to light.   Cardiovascular:      Rate and Rhythm: Normal rate and regular rhythm.      Pulses: Normal pulses.      Heart sounds: Normal heart sounds. No murmur heard.     No friction rub. No gallop.   Pulmonary:      Effort: Pulmonary effort is normal. No respiratory distress.      Breath sounds: Normal breath sounds. No wheezing or rales.   Abdominal:      General: Abdomen is flat. Bowel sounds are normal. There is no distension.      Palpations: Abdomen is soft. There is no mass.      Tenderness: There is no abdominal tenderness. There is no guarding or rebound.      Hernia: No hernia is present.   Genitourinary:     Penis: Normal.       Testes: Normal.   Musculoskeletal:         General: No swelling, tenderness or deformity. Normal range of motion.      Cervical back: Normal range of motion.   Skin:     General: Skin is warm.      Findings: No bruising, lesion or rash.      Comments: R 5th finger verrucous   Neurological:      General: No focal deficit present.      Mental Status: He is alert and oriented to person, place, and time.      Sensory: No sensory deficit.      Motor: No weakness.      Coordination: Coordination normal.   Psychiatric:         Mood and Affect: Mood normal.         Behavior: Behavior normal.       Ancillary studies:    Vision screening- Far 20/30, Near 20/20    Bone density - Normal     Audiogram -Normal      Discussion/Summary  In summary you are 39 y.o. male with no past medical history of note. At the time of your evaluation you were feeling well with no significant health concerns. You have lost about 13 pounds following an Intermittent Fasting lifestyle.    Physical examination including blood pressure , cardiovascular and body mass index/ % body fat  was unremarkable.    Lab studies including complete blood count, comprehensive metabolic panel,  thyroid function, PSA , Vitamin D, Vitamin B12, magnesium,iron, uric acid  ,insulin, Lp(a) Testosterone and inflammatory markers were normal.      Cardiology- Normal EKG, CT-Cardiac score, Exercise stress test, Carotid and Abdominal Aorta ultrasound studies.     Elevated total + LDL-cholesterol levels- Limit animal fats( red meat, cheese, shell fish,  egg yolks, full fat dairy/yoghurt and processed meats).  Instead, replace some of the saturated fats in your diet with healthier unsaturated fats, which are found in fish, nuts, avocados and vegetable oils, such as olive oil, canola oil and safflower oil.  Please see link below for additional information:    https://www.heartuk.org.uk/low-cholesterol-foods/choose-low-cholesterol-food     Intermittent feet cramps- Start Magnesium Glycinate 200-400 mg at bedtime to prevent cramps.    Male pattern hair thinning/hair loss-continue with Finasteride 1 mg daily.    Headaches- currently well controlled. Continue with Tizanidine 4 mg at bedtime.     Cancer screening- you are current with prostate and lung cancer screening tests. Recommend screening colonoscopy at age 45.    Skin-  R 5th finger wart - Please follow up with dermatology for annual examination. Monitor for any skin lesions( ugly duckling sign)  that are different in color, shape, or size than others on body. Recommend SPF 30+, hats with brims, sun protective clothing, and avoiding sun exposure between 10 AM and 2 PM whenever possible.    R buttock dryness/dermatitis- Start Triamcinolone 0.1 % BID/PRN     Vaccine- I would  recommend a shingles (Shingrix) vaccine at age 50  onwards.  In addition I would recommend a tetanus booster every 10 years to maintain immunity.  Recommend RSV at  age 60 onwards and  Pneumonia vaccine (Prevnar 20) at age  65 . Consider COVID-19 booster and flu shot this  fall.      Wellness: Please continue with a balanced diet and a regular physical activity program for at least 150 minutes/week of moderate exercise and 30 minutes/week of resistance/weight training  per week.  Try to get 6 to 8 hours of sleep per night.  Please download the Calm,  Headspace or Unwinding Anxiety  erik from the [employer] to assist with stress and sleep management if necessary.    In conclusion,  I wish to thank you for attending the  executive health program at [facility].  I wish you and your family a safe and healthy Holiday  season.    Please email me at [email or web address] or call me at [phone] if you have any questions pertaining to this report or any other medical concerns.    Be Well    Dr REZA Estrella and Elizabeth Bear Master Clinician in Wellness    Senior Attending Physician , [facility] [place]    [facility]    Clinical     [employer]    [place], [place]    This note was partially generated using the Dragon voice recognition system.  There may be some incorrect wording ,grammar, spelling or punctuation errors that were not corrected prior to committing the note to the medical record.

## 2024-12-13 NOTE — PROGRESS NOTES
Reason for Nutrition Visit:  It was a pleasure speaking with Mr. Harris again to discuss diet and nutrition as part of his Executive Physical follow-up.    Medication Documentation Review Audit       Reviewed by Karen West MA (Medical Assistant) on 08/23/24 at 1604      Medication Order Taking? Sig Documenting Provider Last Dose Status   amoxicillin-pot clavulanate (Augmentin) 875-125 mg tablet [id number]  Take 1 tablet (875 mg) by mouth 2 times a day for 7 days. Luis Glover MD  Active   finasteride (Propecia) 1 mg tablet [id number] Yes Take 1 tablet (1 mg) by mouth once daily. Historical Provider, MD Taking Active   fluticasone (Flonase) 50 mcg/actuation nasal spray [id number] Yes Administer 1 spray into each nostril 2 times a day. Shake gently. Before first use, prime pump. After use, clean tip and replace cap. Luis Glover MD Taking Active   tiZANidine (Zanaflex) 4 mg tablet [id number] Yes Take 1 tablet (4 mg) by mouth once daily. Luis Glover MD Taking Active                     Past Medical Hx:  Patient Active Problem List   Diagnosis    Alopecia, male pattern    Chronic neck pain    Lumbago without sciatica    Myalgia of auxiliary muscles, head and neck    Postural strain    Segmental and somatic dysfunction of cervical region    Segmental and somatic dysfunction of head region    Segmental and somatic dysfunction of pelvic region    Segmental and somatic dysfunction of thoracic region    Skin mole    Vasectomy status        Weight change:  His current weight is 146lbs.  He has lost ~8lbs since his previous physical.  He is happy with his weight loss and wanting to maintain his current weight.    Significant Weight Change: No    Lab Results   Component Value Date    HGBA1C 5.4 03/23/2022    CHOL 247 (H) 12/13/2024    LDLCALC 136 (H) 12/13/2024    TRIG 47 12/13/2024    .0 12/13/2024    LDLF 112 (H) 03/22/2023        Food and Nutrition Hx:  He started intermittent fasting about a year ago.  He  is eating 2 meals a day.  He eats his first meal 12-1pm.  He will incorporate an afternoon snack such as fruit or popcorn.      He is eating out for lunch most days of the week.  He will either go to a restaurant or get prepared foods from [employer].  He is eating out for dinner about twice a week.      He is eating fish twice a week, usually salmon.      Food Recall:  12/5/24  8:30am: black coffee  10:15am: black coffee  12:30pm: [employer] corned beef and swiss sandwich (prepared foods section)  1:45pm: green grapes (10)  3:49pm: [employer] protein snack bar (10g protein)  6:00pm: BBQ salmon, white rice, broccolini    12/6/24  8:15am: black coffee  9:30am: black coffee  11:00am: black coffee  12:10pm: [employer] protein snack bar  1:45pm: nishant sesame salad with grilled chicken  6:15pm: Chipotle beef barbacoa bowl-fajita veggies, kusum lettuce, salsa, cheese, beef  6:45pm: gin and tonic  7:00pm: single serving bag of Smart Pop popcorn    12/7/24  9:00am: black coffee  1:00pm: 1 piece cheese pizza  1:30pm: [employer] spicy tuna sushi roll with brown rice  3:00pm: enedelia  5:00pm: apple  8:00pm: Manhattan cocktail  8:15pm: [place] restaurant, tapas sharing dinner style-aged cheddar, mushroom tartlett, half popover, butternut squash, steak tips      Beverages: water-40oz a day; coffee-3 cups a day (black); unsweetened iced tea-on occasion; Diet Coke-1 can a day; alcohol-4 drinks a week    Allergies: None  Intolerance: None    GI Symptoms : He has been waking up early, around 5am, with gas and having to use the bathroom (a bowel movement).  This has been happening for the past 1.5-2 months.      Exercise: 50 push ups, 50 crunches, 20 leg raises every morning.  He is not doing any cardio.      Sleep duration/quality : 6.5 hours a night.  He is waking up to use the bathroom, but goes back to sleep easily.  It can take a long time to fall asleep.      Supplements: Denies     Cravings: Salty  Energy Levels: Fluctuates      Estimated  Nutrient Needs:    Calories for Weight Maintenance: 1984 ([place] x 1.3 activity factor)  Protein Needs: 110-120g/day (0.8g/lb, 146lbs)    Nutrition Diagnosis:    Diagnosis Statement 1:  Diagnosis Status: Ongoing  Diagnosis : Altered nutrition related lab values  related to  dietary and physiological factors  as evidenced by  elevated cholesterol (247mg/dl), elevated LDL cholesterol (136mg/dl)    Diagnosis Statement 2:  Diagnosis Status: Ongoing  Diagnosis : Inadequate fiber intake  related to food and nutrition related knowledge deficit concerning desirable quantities of fiber as evidenced by  food recall showing he is not meeting the recommended 35g fiber per day ; only eating 2 meals a day    Diagnosis Statement 3:   Diagnosis Status: New  Diagnosis : Inadequate protein intake  related to  food- and nutrition-related knowledge deficit regarding appropriate protein intake  as evidenced by  only eating 2 meals a day; food recall showing he is not meeting the recommended 110-120g protein per day    Nutrition Interventions:  Increased Fiber Diet, Increased Soluble Fiber, and Increased Protein Diet      Nutrition Goals:  Nutrition Goals : Consume prescribed supplements  Initiate Exercise Regimen  Reduce LDL level  Adequate fiber intake  Adequate protein intake    Nutrition Recommendations:    1) Aim for at least 35g fiber daily.  One way to help you reach this goal is to include non-starchy vegetables with both lunch and dinner (at least 1-2 cups).  Be sure to include a wide variety of colorful vegetables throughout the week.  Also, be sure to use 100% whole wheat/whole grain breads/pastas, brown/wild rice, quinoa, oats, beans, lentils, starchy vegetables-potatoes, sweet potatoes, corn, peas, winter squash and limit/avoid white, processed carbohydrates (white bread, white pasta, white rice, etc).    2) Choose 3 out of 5 of the following daily to help you reach your daily fiber goals:  -1 cup berries (4-5g  fiber)  -1/2 cup beans (7g fiber)  -1/4 cup nuts (5g fiber)  -1/3 avocado (4g fiber)  -3 cups dark leafy greens (5g fiber)    3) To help lower LDL cholesterol, incorporate foods high in soluble fiber such as: black beans, kidney beans, [place] Ground sprouts, sweet potatoes, broccoli, turnips, carrots, avocado, pears, apricots, nectarines, apples, flax seeds, sunflower seeds, hazelnuts, oats.  Aim for 5-10g soluble fiber per day.    4) Ensure you are getting adequate amounts of protein consistently throughout the day.  Your daily protein goal is 110-120g.  Aim for 40-50g per meal (lunch and dinner) and include 10-20g protein at snacks.    5) For a protein boost to your day, try adding unflavored Vital Proteins collagen powder to your coffee in the morning; provides 18g protein.    6) For your afternoon snack, look for high protein, high fiber options.  Examples:  -Aloha protein bars (14g protein, 9-10g fiber)  -Haydee Crunch cereal (1/2 cup provides 11g protein, 9g fiber)  -1 cup cottage cheese (25-28g protein) + fruit (berries)  -nuts + fruit  -Chomps meat stick (10g protein) + fruit    7) Recommended supplements:  -Pure Encapsulations magnesium glycinate, 3 capsules at bedtime  -Vitamin D3, 2000-3000IUs/day.  Be sure to take this with food to enhance absorption.

## 2024-12-14 ENCOUNTER — PHARMACY VISIT (OUTPATIENT)
Dept: PHARMACY | Facility: CLINIC | Age: 39
End: 2024-12-14
Payer: COMMERCIAL

## 2024-12-15 LAB — LPA SERPL-MCNC: 9 MG/DL

## 2024-12-17 ENCOUNTER — PHARMACY VISIT (OUTPATIENT)
Dept: PHARMACY | Facility: CLINIC | Age: 39
End: 2024-12-17
Payer: COMMERCIAL

## 2024-12-17 PROCEDURE — RXMED WILLOW AMBULATORY MEDICATION CHARGE

## 2024-12-18 LAB
POC APPEARANCE, URINE: CLEAR
POC BILIRUBIN, URINE: NEGATIVE
POC BLOOD, URINE: NEGATIVE
POC COLOR, URINE: YELLOW
POC GLUCOSE, URINE: NEGATIVE MG/DL
POC KETONES, URINE: NEGATIVE MG/DL
POC LEUKOCYTES, URINE: NEGATIVE
POC NITRITE,URINE: NEGATIVE
POC PH, URINE: 7 PH
POC PROTEIN, URINE: NEGATIVE MG/DL
POC SPECIFIC GRAVITY, URINE: 1.01
POC UROBILINOGEN, URINE: 0.2 EU/DL

## 2024-12-19 LAB
TESTOSTERONE FREE (CHAN): 86.5 PG/ML (ref 35–155)
TESTOSTERONE,TOTAL,LC-MS/MS: 733 NG/DL (ref 250–1100)

## 2025-04-04 ENCOUNTER — OFFICE VISIT (OUTPATIENT)
Dept: URGENT CARE | Age: 40
End: 2025-04-04
Payer: COMMERCIAL

## 2025-04-04 ENCOUNTER — HOSPITAL ENCOUNTER (OUTPATIENT)
Dept: RADIOLOGY | Facility: CLINIC | Age: 40
Discharge: HOME | End: 2025-04-04
Payer: COMMERCIAL

## 2025-04-04 VITALS
TEMPERATURE: 99 F | WEIGHT: 146 LBS | OXYGEN SATURATION: 94 % | HEART RATE: 84 BPM | DIASTOLIC BLOOD PRESSURE: 81 MMHG | SYSTOLIC BLOOD PRESSURE: 120 MMHG | RESPIRATION RATE: 18 BRPM | BODY MASS INDEX: 22.87 KG/M2

## 2025-04-04 DIAGNOSIS — B33.8 INFECTION DUE TO RESPIRATORY SYNCYTIAL VIRUS (RSV), UNSPECIFIED INFECTION TYPE: Primary | ICD-10-CM

## 2025-04-04 DIAGNOSIS — R50.9 FEVER, UNSPECIFIED FEVER CAUSE: ICD-10-CM

## 2025-04-04 DIAGNOSIS — B33.8 INFECTION DUE TO RESPIRATORY SYNCYTIAL VIRUS (RSV), UNSPECIFIED INFECTION TYPE: ICD-10-CM

## 2025-04-04 DIAGNOSIS — R05.1 ACUTE COUGH: ICD-10-CM

## 2025-04-04 LAB
POC CORONAVIRUS SARS-COV-2 PCR: NEGATIVE
POC HUMAN RHINOVIRUS PCR: NEGATIVE
POC INFLUENZA A VIRUS PCR: NEGATIVE
POC INFLUENZA B VIRUS PCR: NEGATIVE
POC RESPIRATORY SYNCYTIAL VIRUS PCR: POSITIVE

## 2025-04-04 PROCEDURE — 1036F TOBACCO NON-USER: CPT | Performed by: FAMILY MEDICINE

## 2025-04-04 PROCEDURE — 99214 OFFICE O/P EST MOD 30 MIN: CPT | Performed by: FAMILY MEDICINE

## 2025-04-04 PROCEDURE — 87631 RESP VIRUS 3-5 TARGETS: CPT | Performed by: FAMILY MEDICINE

## 2025-04-04 PROCEDURE — 71046 X-RAY EXAM CHEST 2 VIEWS: CPT

## 2025-04-04 RX ORDER — ALBUTEROL SULFATE 90 UG/1
2 INHALANT RESPIRATORY (INHALATION) EVERY 6 HOURS PRN
Qty: 8.5 G | Refills: 0 | Status: SHIPPED | OUTPATIENT
Start: 2025-04-04 | End: 2025-05-04

## 2025-04-04 RX ORDER — AZITHROMYCIN 250 MG/1
TABLET, FILM COATED ORAL
Qty: 6 TABLET | Refills: 0 | Status: SHIPPED | OUTPATIENT
Start: 2025-04-04 | End: 2025-04-09

## 2025-04-04 RX ORDER — PREDNISONE 20 MG/1
20 TABLET ORAL 2 TIMES DAILY
Qty: 10 TABLET | Refills: 0 | Status: SHIPPED | OUTPATIENT
Start: 2025-04-04 | End: 2025-04-09

## 2025-04-04 RX ORDER — BENZONATATE 200 MG/1
200 CAPSULE ORAL 3 TIMES DAILY PRN
Qty: 30 CAPSULE | Refills: 0 | Status: SHIPPED | OUTPATIENT
Start: 2025-04-04 | End: 2025-04-14

## 2025-04-04 ASSESSMENT — PATIENT HEALTH QUESTIONNAIRE - PHQ9
SUM OF ALL RESPONSES TO PHQ9 QUESTIONS 1 AND 2: 0
1. LITTLE INTEREST OR PLEASURE IN DOING THINGS: NOT AT ALL
2. FEELING DOWN, DEPRESSED OR HOPELESS: NOT AT ALL

## 2025-04-04 NOTE — PROGRESS NOTES
HPI:  Patient states hat for the past 5 days he had a cough and overnight started running a fever.  No cp or sob.  No n/v/diarrhea.  Pt states that his 1 y/o was sick with similar symptoms last week.       ROS:  No cp  No sob  +cough  +fever    PE:    A&O x3  NCAT  PERRLA, EOMI  TM clear bl  No pharyngeal erythema, +pnd  RRR  Coarse breath sounds at the left base  MOEx4  No focal deficit  Judgement normal  No submandibular nodes    Results:  CXR:initial read no acute findings  Spotfire: +RSV/-Covid/-flu/-rhinovirus    A/P:   Cough  Fever  RSV+    We will call you with xray results if you need further treatment.  Increase fluids.  Rest.  Vaporub.  Cool mist humidifier. Eat yogurt and take probiotics when on medication.  Tylenol as needed for pain.  Keep a diary of symptoms.  Recheck with your doctor in a week if no improvement. Go to the ER if starts getting worse.

## 2025-04-11 ENCOUNTER — OFFICE VISIT (OUTPATIENT)
Dept: PRIMARY CARE | Facility: CLINIC | Age: 40
End: 2025-04-11
Payer: COMMERCIAL

## 2025-04-11 VITALS
SYSTOLIC BLOOD PRESSURE: 115 MMHG | OXYGEN SATURATION: 95 % | HEART RATE: 69 BPM | BODY MASS INDEX: 22.29 KG/M2 | TEMPERATURE: 98.4 F | WEIGHT: 142 LBS | DIASTOLIC BLOOD PRESSURE: 71 MMHG | HEIGHT: 67 IN

## 2025-04-11 DIAGNOSIS — J21.0 RSV (ACUTE BRONCHIOLITIS DUE TO RESPIRATORY SYNCYTIAL VIRUS): Primary | ICD-10-CM

## 2025-04-11 DIAGNOSIS — R05.1 ACUTE COUGH: ICD-10-CM

## 2025-04-11 PROCEDURE — 3008F BODY MASS INDEX DOCD: CPT | Performed by: STUDENT IN AN ORGANIZED HEALTH CARE EDUCATION/TRAINING PROGRAM

## 2025-04-11 PROCEDURE — 1036F TOBACCO NON-USER: CPT | Performed by: STUDENT IN AN ORGANIZED HEALTH CARE EDUCATION/TRAINING PROGRAM

## 2025-04-11 PROCEDURE — 99214 OFFICE O/P EST MOD 30 MIN: CPT | Performed by: STUDENT IN AN ORGANIZED HEALTH CARE EDUCATION/TRAINING PROGRAM

## 2025-04-11 RX ORDER — BENZONATATE 200 MG/1
200 CAPSULE ORAL 3 TIMES DAILY PRN
Qty: 30 CAPSULE | Refills: 0 | Status: SHIPPED | OUTPATIENT
Start: 2025-04-11 | End: 2025-04-21

## 2025-04-11 ASSESSMENT — ENCOUNTER SYMPTOMS
FEVER: 0
HEMOPTYSIS: 0
COUGH: 1
CHILLS: 0
EYE PAIN: 0
RHINORRHEA: 1
LOSS OF SENSATION IN FEET: 0
WHEEZING: 1
HEMATURIA: 0
HEARTBURN: 0
HEADACHES: 1
UNEXPECTED WEIGHT CHANGE: 0
RHINORRHEA: 0
ABDOMINAL PAIN: 0
WEIGHT LOSS: 0
SORE THROAT: 1
SHORTNESS OF BREATH: 0
DEPRESSION: 0
OCCASIONAL FEELINGS OF UNSTEADINESS: 0
LIGHT-HEADEDNESS: 0
DIZZINESS: 0
SWEATS: 1
FEVER: 1
MYALGIAS: 1

## 2025-04-11 ASSESSMENT — PATIENT HEALTH QUESTIONNAIRE - PHQ9
1. LITTLE INTEREST OR PLEASURE IN DOING THINGS: NOT AT ALL
SUM OF ALL RESPONSES TO PHQ9 QUESTIONS 1 AND 2: 0
2. FEELING DOWN, DEPRESSED OR HOPELESS: NOT AT ALL

## 2025-04-11 NOTE — PROGRESS NOTES
"Subjective     Patient ID: Sharath Harris is a 39 y.o. male who presents for  acute cough       Answers submitted by the patient for this visit:  Cough Questionnaire (Submitted on 4/11/2025)  Chief Complaint: Cough  Chronicity: recurrent  Onset: 1 to 4 weeks ago  Progression since onset: waxing and waning  Frequency: every few minutes  Cough characteristics: non-productive  ear congestion: Yes  heartburn: No  hemoptysis: No  nasal congestion: No  sweats: Yes  weight loss: No  Aggravated by: nothing    Review of Systems   Constitutional:  Negative for chills, fever and unexpected weight change.   HENT:  Positive for postnasal drip and sore throat. Negative for ear pain and rhinorrhea.    Eyes:  Negative for pain.   Respiratory:  Positive for cough and wheezing. Negative for shortness of breath.    Cardiovascular:  Negative for chest pain.   Gastrointestinal:  Negative for abdominal pain.   Genitourinary:  Negative for hematuria.   Musculoskeletal:  Positive for myalgias.   Skin:  Negative for rash.   Neurological:  Positive for headaches. Negative for dizziness, syncope and light-headedness.   Psychiatric/Behavioral:  Negative for behavioral problems and suicidal ideas.        /71   Pulse 69   Temp 36.9 °C (98.4 °F)   Ht 1.702 m (5' 7\")   Wt 64.4 kg (142 lb)   SpO2 95%   BMI 22.24 kg/m²      Objective   Physical Exam  Vitals reviewed.   Constitutional:       General: He is not in acute distress.  HENT:      Head: Normocephalic.      Right Ear: External ear normal.      Left Ear: External ear normal.      Nose: No rhinorrhea.      Mouth/Throat:      Mouth: Mucous membranes are moist.   Eyes:      Conjunctiva/sclera: Conjunctivae normal.   Cardiovascular:      Rate and Rhythm: Normal rate and regular rhythm.      Heart sounds: No murmur heard.     No friction rub. No gallop.   Pulmonary:      Effort: No respiratory distress.      Breath sounds: No wheezing, rhonchi or rales.   Abdominal:      General: " "Bowel sounds are normal. There is no distension.      Palpations: Abdomen is soft.      Tenderness: There is no abdominal tenderness.   Musculoskeletal:      Cervical back: Neck supple.      Right lower leg: No edema.      Left lower leg: No edema.   Skin:     General: Skin is warm and dry.      Capillary Refill: Capillary refill takes less than 2 seconds.   Neurological:      Mental Status: He is alert.      Gait: Gait normal.           Labs:   Lab Results   Component Value Date    WBC 5.2 12/13/2024    HGB 15.0 12/13/2024    HCT 44.4 12/13/2024     12/13/2024    TSH 0.87 12/13/2024    PSA 0.61 03/23/2022     Lab Results   Component Value Date     12/13/2024    K 4.9 12/13/2024     12/13/2024    BUN 21 12/13/2024    CREATININE 1.01 12/13/2024    GLUCOSE 88 12/13/2024    CALCIUM 10.4 (H) 12/13/2024    PROT 7.4 12/13/2024    BILITOT 0.7 12/13/2024    ALKPHOS 41 12/13/2024    AST 19 12/13/2024    ALT 14 12/13/2024     Lab Results   Component Value Date    CHOL 247 (H) 12/13/2024    CHOL 237 (H) 03/22/2023    CHOL 211 (H) 03/23/2022      Lab Results   Component Value Date    TRIG 47 12/13/2024    TRIG 108 03/22/2023    TRIG 66 03/23/2022      Lab Results   Component Value Date    .0 12/13/2024    .3 03/22/2023    HDL 87.0 03/23/2022     Lab Results   Component Value Date    LDLCALC 136 (H) 12/13/2024      Lab Results   Component Value Date    VLDL 9 12/13/2024    VLDL 22 03/22/2023    VLDL 13 03/23/2022    No components found for: \"CHOLHDLRATI0\"    Imaging/Testing: XR chest 2 views  Narrative: Interpreted By:  Pee Degroot,   STUDY:  XR CHEST 2 VIEWS;  4/4/2025 12:33 pm      INDICATION:  Signs/Symptoms:cough, fever. RSV +.      COMPARISON:  April 12, 2022 CT calcium score examination and December 13 2024  chest radiographs.      ACCESSION NUMBER(S):  [id number]      ORDERING CLINICIAN:  JULIENNE KAYE      FINDINGS:  LIFE SUPPORT DEVICES:  None      CARDIOMEDIASTINAL " SILHOUETTE:  Cardiomediastinal silhouette is normal in size and configuration.      LUNGS:  Lungs are clear.      ABDOMEN:  No remarkable upper abdominal findings.      BONES:  No acute osseous changes.      Impression: 1.  No evidence of acute cardiopulmonary process.          Signed by: Pee Degroot 4/4/2025 12:42 PM  Dictation workstation:   [id number]    Assessment/Plan   Problem List Items Addressed This Visit    None  Visit Diagnoses       RSV (acute bronchiolitis due to respiratory syncytial virus)    -  Primary    Acute cough        Relevant Medications    benzonatate (Tessalon) 200 mg capsule        -I reviewed outside urgent care provider notes and diagnostic studies.  I confirmed that patient was RSV positive at that time and COVID and influenza negative.  I he still has persistent symptoms of cough, subjective wheezing, congestive headaches.  Will prescribe Tessalon for cough.  Patient will take phenylephrine for congestion.  I reviewed medication side effects including potential increased blood pressure.      [unfilled]    Patient and I discussed diet/nutrition, lifestyle modifications, safety, medication indications and side effects, and health goals.    orders and follow up as documented in EMR, repeat labs ordered prior to next appointment, reviewed compliance with lifestyle measures, reviewed diet, exercise and weight control, reviewed medications and side effects in detail           I have reviewed OARRS report, consistent with prescribed medication. I have considered risks of abuse, diversion, side effects and feel clinically benefit of medication outweighs risks at this time.

## 2025-06-18 PROBLEM — Z98.52 VASECTOMY STATUS: Status: RESOLVED | Noted: 2023-02-24 | Resolved: 2025-06-18

## 2025-06-18 PROBLEM — G89.29 CHRONIC NECK PAIN: Status: RESOLVED | Noted: 2023-02-24 | Resolved: 2025-06-18

## 2025-06-18 PROBLEM — M54.50 LUMBAGO WITHOUT SCIATICA: Status: RESOLVED | Noted: 2023-02-24 | Resolved: 2025-06-18

## 2025-06-18 PROBLEM — M99.01 SEGMENTAL AND SOMATIC DYSFUNCTION OF CERVICAL REGION: Status: RESOLVED | Noted: 2023-02-24 | Resolved: 2025-06-18

## 2025-06-18 PROBLEM — L64.9 ALOPECIA, MALE PATTERN: Status: RESOLVED | Noted: 2023-02-24 | Resolved: 2025-06-18

## 2025-06-18 PROBLEM — M54.2 CHRONIC NECK PAIN: Status: RESOLVED | Noted: 2023-02-24 | Resolved: 2025-06-18

## 2025-06-18 PROBLEM — M79.9 POSTURAL STRAIN: Status: RESOLVED | Noted: 2023-02-24 | Resolved: 2025-06-18

## 2025-06-18 PROBLEM — D22.9 SKIN MOLE: Status: RESOLVED | Noted: 2023-02-24 | Resolved: 2025-06-18

## 2025-06-18 PROBLEM — M79.12 MYALGIA OF AUXILIARY MUSCLES, HEAD AND NECK: Status: RESOLVED | Noted: 2023-02-24 | Resolved: 2025-06-18

## 2025-06-18 PROBLEM — M99.02 SEGMENTAL AND SOMATIC DYSFUNCTION OF THORACIC REGION: Status: RESOLVED | Noted: 2023-02-24 | Resolved: 2025-06-18

## 2025-06-18 PROBLEM — M99.00 SEGMENTAL AND SOMATIC DYSFUNCTION OF HEAD REGION: Status: RESOLVED | Noted: 2023-02-24 | Resolved: 2025-06-18

## 2025-06-18 PROBLEM — M99.05 SEGMENTAL AND SOMATIC DYSFUNCTION OF PELVIC REGION: Status: RESOLVED | Noted: 2023-02-24 | Resolved: 2025-06-18

## 2025-06-18 NOTE — PROGRESS NOTES
"Subjective     Patient ID: Sharath Harris is a 40 y.o. male     Interval History 6/19/2025:  Patient noticed a bump in his groin a week ago. He was more physically active. Patient thinks he might have had a hernia as a teenager. No symptoms of urinary frequency, urgency, dysuria, blood. He has been having night sweats--to the point that he needs to have towel for the past year. No unintentional weight loss. Maternal grandfather had pancreatic cancer. No colon cancers.     Tobacco/Alcohol/Drug Use:   Social History     Tobacco Use    Smoking status: Never     Passive exposure: Never    Smokeless tobacco: Never   Substance Use Topics    Alcohol use: Yes     Alcohol/week: 3.0 - 4.0 standard drinks of alcohol     Types: 3 - 4 Standard drinks or equivalent per week       Required Screenings/Immunizations:   Health Maintenance Due   Topic Date Due    MMR Vaccines (1 of 1 - Standard series) Never done    Varicella Vaccines (1 of 2 - 13+ 2-dose series) Never done    Hepatitis B Vaccines (1 of 3 - 19+ 3-dose series) Never done    COVID-19 Vaccine (5 - 2024-25 season) 09/01/2024    DTaP/Tdap/Td Vaccines (3 - Td or Tdap) 01/19/2025       Problems to be addressed today in addition to Preventative Services: As stated in orders.     Review of Systems   Constitutional:  Negative for chills, fever and unexpected weight change.   Genitourinary:  Negative for decreased urine volume, difficulty urinating, dysuria, flank pain, frequency, genital sores, penile pain, penile swelling, scrotal swelling, testicular pain and urgency.       /68 (BP Location: Left arm, Patient Position: Sitting, BP Cuff Size: Adult)   Pulse 76   Temp 37.2 °C (98.9 °F) (Temporal)   Ht 1.702 m (5' 7\")   Wt 66.7 kg (147 lb)   SpO2 96%   BMI 23.02 kg/m²      Objective   Physical Exam  Vitals and nursing note reviewed.   Constitutional:       General: He is not in acute distress.  Cardiovascular:      Rate and Rhythm: Normal rate.   Genitourinary:     " "Comments: Slight ~.75mm bulge in left inguinal area. Reducible.   Skin:     General: Skin is warm and dry.   Neurological:      Mental Status: He is alert.           Labs:   Lab Results   Component Value Date    WBC 5.2 12/13/2024    HGB 15.0 12/13/2024    HCT 44.4 12/13/2024     12/13/2024    TSH 0.87 12/13/2024    PSA 0.61 03/23/2022     Lab Results   Component Value Date     12/13/2024    K 4.9 12/13/2024     12/13/2024    BUN 21 12/13/2024    CREATININE 1.01 12/13/2024    GLUCOSE 88 12/13/2024    CALCIUM 10.4 (H) 12/13/2024    PROT 7.4 12/13/2024    BILITOT 0.7 12/13/2024    ALKPHOS 41 12/13/2024    AST 19 12/13/2024    ALT 14 12/13/2024     Lab Results   Component Value Date    CHOL 247 (H) 12/13/2024    CHOL 237 (H) 03/22/2023    CHOL 211 (H) 03/23/2022      Lab Results   Component Value Date    TRIG 47 12/13/2024    TRIG 108 03/22/2023    TRIG 66 03/23/2022      Lab Results   Component Value Date    .0 12/13/2024    .3 03/22/2023    HDL 87.0 03/23/2022     Lab Results   Component Value Date    LDLCALC 136 (H) 12/13/2024      Lab Results   Component Value Date    VLDL 9 12/13/2024    VLDL 22 03/22/2023    VLDL 13 03/23/2022    No components found for: \"CHOLHDLRATI0\"    Imaging/Testing: XR chest 2 views  Narrative: Interpreted By:  Pee Degroot,   STUDY:  XR CHEST 2 VIEWS;  4/4/2025 12:33 pm      INDICATION:  Signs/Symptoms:cough, fever. RSV +.      COMPARISON:  April 12, 2022 CT calcium score examination and December 13 2024  chest radiographs.      ACCESSION NUMBER(S):  [id number]      ORDERING CLINICIAN:  JULIENNE KAYE      FINDINGS:  LIFE SUPPORT DEVICES:  None      CARDIOMEDIASTINAL SILHOUETTE:  Cardiomediastinal silhouette is normal in size and configuration.      LUNGS:  Lungs are clear.      ABDOMEN:  No remarkable upper abdominal findings.      BONES:  No acute osseous changes.      Impression: 1.  No evidence of acute cardiopulmonary process.          Signed by: " Pee Degroot 4/4/2025 12:42 PM  Dictation workstation:   [id number]      Problem List Items Addressed This Visit    None  Visit Diagnoses         Left inguinal hernia    -  Primary        Left inguinal hernia - Reducible  - No red flag signs/symptoms for malignancy.  -Will continue to monitor. Instructions were provided to patient.       As part of today's Preventative Visit, an age and gender-appropriate history and physical was performed, as documented below. Counseling and anticipatory guidance were performed, and risk factor reduction interventions (including [place] Preventative Services Task Force recommended screening tests) were utilized/ordered as outlined in the above Assessment and Plan. All patient medications were reviewed, and refilled if necessary.    Patient and I discussed diet/nutrition, lifestyle modifications, safety, medication indications and side effects, and health goals.    current treatment plan is effective, no change in therapy, lab results reviewed with patient, reviewed compliance with lifestyle measures           I have reviewed OARRS report, consistent with prescribed medication. I have considered risks of abuse, diversion, side effects and feel clinically benefit of medication outweighs risks at this time.

## 2025-06-19 ENCOUNTER — OFFICE VISIT (OUTPATIENT)
Dept: PRIMARY CARE | Facility: CLINIC | Age: 40
End: 2025-06-19
Payer: COMMERCIAL

## 2025-06-19 VITALS
HEART RATE: 76 BPM | DIASTOLIC BLOOD PRESSURE: 68 MMHG | WEIGHT: 147 LBS | HEIGHT: 67 IN | BODY MASS INDEX: 23.07 KG/M2 | OXYGEN SATURATION: 96 % | SYSTOLIC BLOOD PRESSURE: 113 MMHG | TEMPERATURE: 98.9 F

## 2025-06-19 DIAGNOSIS — K40.90 LEFT INGUINAL HERNIA: Primary | ICD-10-CM

## 2025-06-19 PROCEDURE — 99213 OFFICE O/P EST LOW 20 MIN: CPT | Performed by: STUDENT IN AN ORGANIZED HEALTH CARE EDUCATION/TRAINING PROGRAM

## 2025-06-19 PROCEDURE — 3008F BODY MASS INDEX DOCD: CPT | Performed by: STUDENT IN AN ORGANIZED HEALTH CARE EDUCATION/TRAINING PROGRAM

## 2025-06-19 PROCEDURE — 1036F TOBACCO NON-USER: CPT | Performed by: STUDENT IN AN ORGANIZED HEALTH CARE EDUCATION/TRAINING PROGRAM

## 2025-06-19 ASSESSMENT — ENCOUNTER SYMPTOMS
LOSS OF SENSATION IN FEET: 0
DEPRESSION: 0
CHILLS: 0
FEVER: 0
OCCASIONAL FEELINGS OF UNSTEADINESS: 0
DYSURIA: 0
UNEXPECTED WEIGHT CHANGE: 0
FREQUENCY: 0
DIFFICULTY URINATING: 0
FLANK PAIN: 0

## 2025-06-19 ASSESSMENT — PATIENT HEALTH QUESTIONNAIRE - PHQ9
2. FEELING DOWN, DEPRESSED OR HOPELESS: NOT AT ALL
SUM OF ALL RESPONSES TO PHQ9 QUESTIONS 1 AND 2: 0
1. LITTLE INTEREST OR PLEASURE IN DOING THINGS: NOT AT ALL